# Patient Record
(demographics unavailable — no encounter records)

---

## 2024-10-11 NOTE — DISCUSSION/SUMMARY
[de-identified] : Surgical findings reviewed. Start PT Program, HEP discussed. Return in 4 weeks for ree-valuation.   10/11/2024    RE:  BRUCE ESCALERA   [facility] #- [id number]    Attention:  Nurse Reviewer /Medical Director  I am writing this letter as a medical necessity for PT program. Patient has tried analgesics, non-steroid anti-inflammatory agents,  hot or cold compresses,injections of corticosteroids, etc)  which in combination or by themselves has not worked. Based on my patient's condition, I strongly believe that the PT is medically needed.   Thank you for your time and consideration.

## 2024-10-11 NOTE — DISCUSSION/SUMMARY
[de-identified] : Surgical findings reviewed. Start PT Program, HEP discussed. Return in 4 weeks for ree-valuation.   10/11/2024    RE:  BRUCE ESCALERA   [facility] #- [id number]    Attention:  Nurse Reviewer /Medical Director  I am writing this letter as a medical necessity for PT program. Patient has tried analgesics, non-steroid anti-inflammatory agents,  hot or cold compresses,injections of corticosteroids, etc)  which in combination or by themselves has not worked. Based on my patient's condition, I strongly believe that the PT is medically needed.   Thank you for your time and consideration.

## 2024-10-11 NOTE — PHYSICAL EXAM
[Left] : left shoulder [Sitting] : sitting [Mild] : mild [] : no ecchymosis [TWNoteComboBox7] : active forward flexion 120 degrees

## 2024-10-11 NOTE — HISTORY OF PRESENT ILLNESS
[5] : 5 [Burning] : burning [Localized] : localized [de-identified] : Patient s/p left shoulder arthroscopic labral debridement, cuff debridement, SAD, DCE, calcium excision on 10/1/24. She denies fever/chills. Started some HEP. PAin is manageable.  [] : no [FreeTextEntry1] : l shoulder [FreeTextEntry6] : soreness [de-identified] : 10-1-24 [de-identified] : 10-1-24

## 2024-10-11 NOTE — HISTORY OF PRESENT ILLNESS
[5] : 5 [Burning] : burning [Localized] : localized [de-identified] : Patient s/p left shoulder arthroscopic labral debridement, cuff debridement, SAD, DCE, calcium excision on 10/1/24. She denies fever/chills. Started some HEP. PAin is manageable.  [] : no [FreeTextEntry1] : l shoulder [FreeTextEntry6] : soreness [de-identified] : 10-1-24 [de-identified] : 10-1-24

## 2024-11-01 NOTE — DATA REVIEWED
[FreeTextEntry1] : On my interpretation of these images and reports Lumbar Spine MRI 10/14/2024  fusion intact L4/5. above fusion left sided stenosis. mild foraminal stenosis L5S1 below fusion.  Impression:  Postoperative changes at L4-L5 with interval resolution of central stenosis. There is persistent mild to moderate bilateral foraminal stenosis at this level. Unchanged disc herniations at L1-L2 and L2-L3. See details below. L1-L2: There is a central disc herniation impressing upon the thecal sac resulting in mild central stenosis and mild lateral foraminal stenosis. L2-L3: There is a right subarticular/foraminal disc herniation resulting in moderate lateral recess stenosis and mild right foraminal stenosis. L3-L4: Trace retrolisthesis of L3 on L4. There is a disc bulge resulting in mild central and mild bilateral foraminal stenosis. L4-L5: Bilateral laminectomies noted and susceptibility artifact from posterior instrumentation. There is a disc bulge with interval resolution of central stenosis since the prior preoperative study. There is mild to moderate bilateral foraminal stenosis. There is suggestion of mild bone marrow edema along the left aspect of the L5 vertebral body. Further evaluation with noncontrast CT is recommended to assess the hardware.  I stop paperwork reviewed

## 2024-11-01 NOTE — DISCUSSION/SUMMARY
[de-identified] : Reviewed the updated MRI, which reveals an intact fusion at L4/5. Above the fusion, there is left-sided stenosis. There is mild foraminal stenosis at L5/S1 below the fusion. We discussed that the right-sided radicular leg pain likely stems from the L5S1 region. The patient was advised to continue home exercises and stretching, she declines formal PT.  A referral was made for a consultation with Dr. Abernathy to discuss LESI. Discussed potential surgical candidacy for extension of lumbar decompression and fusion to L5S1 but only if absolutely refractory to exhauustive non operative mgmt and symptoms are functionally incapacitating. F/u 6 weeks  Prior to appointment and during encounter with patient extensive medical records were reviewed including but not limited to, hospital records, outpatient records, imaging results, and lab data. During this appointment the patient was examined, diagnoses were discussed and explained in a face to face manner. In addition extensive time was spent reviewing aforementioned diagnostic studies. Counseling including abnormal image results, differential diagnoses, treatment options, risk and benefits, lifestyle changes, current condition, and current medications was performed. Patient's comments, questions, and concerns were addressed and patient verbalized understanding. Based on this patient's presentation at our office, which is an orthopedic spine surgeon's office, this patient inherently / intrinsically has a risk, however minute, of developing issues such as Cauda equina syndrome, bowel and bladder changes, or progression of motor or neurological deficits such as paralysis which may be permanent.  CLARE RICHARDS Acting as a Scribe for Dr. Marlon TENORIO, Clare Richards, attest that this documentation has been prepared under the direction and in the presence of Provider Aric Mason MD.

## 2024-11-01 NOTE — HISTORY OF PRESENT ILLNESS
[Lower back] : lower back [8] : 8 [7] : 7 [Radiating] : radiating [Constant] : constant [Rest] : rest [Ice] : ice [Sitting] : sitting [Walking] : walking [] : yes [Retired] : Work status: retired [de-identified] : 10/30/2024 - Patient presenting to review lumbar MRI. Twisted ankle Sunday, thus increasing her right buttock pain radiating into her leg.  09/25/2024 -   06/22/2024 - Patient presents with c/o new onset lower lumbar pain with radiation to right buttock, posterior thigh and calf of about 6 weeks duration. describes a fall on stairs landing hard on buttock. Pain trend worsening over time. Taking Tylenol and Ibuprofen. Pain aggravated with extended sitting.  03/18/2024 - The patient is s/p REVISION POSTERIOR FUSION AND INSTRUMENTATION WITH ILIAC GRAFTLUMBAR L4-5 6/12/23. Overall her symptoms are described as tolerable. She complains of low back pain radiating into her buttock/leg aggravated by walking extended distances.   02/05/2024 - The patient is presenting approximately three months post-operation. Overall, her recovery is progressing well, and she is actively participating in physical therapy twice a week, which has proven beneficial in her continued improvement. Notably, she denies experiencing any radiating leg pain during this recovery period. Continues to use bone fusion stim.   12/22/2023 -  The patient is s/p  REVISION POSTERIOR FUSION AND INSTRUMENTATION WITH ILIAC GRAFTLUMBAR L4-5. Overall she is recovering nicely. She complains of intermittent pain into the left leg, which she describes as controlled (11/09/2023).  Taking pain killer as prescribed for symptom control.   11/22/2023 -  The patient is s/p  REVISION POSTERIOR FUSION AND INSTRUMENTATION WITH ILIAC GRAFTLUMBAR L4-5. Overall recovering well postoperatively and she is pleased with her progress thus far. Ambulating well. No incisional or constitutional complaints. Taking pain killer as prescribed for symptom control.  Date of Surgery: 11/09/2023 Pain:    At Rest: _/10  With Activity:  _/10   10/04/2023 - Patient presenting for a CT Review. Symptoms remain the same. She complains of low back pain radiating into primarily the posterior right leg, sometimes will radiate laterally. States pain is getting worse. Unable to get comfortable. Difficulty sleeping. Patient states she has been a non smoker for 40 years. Pain is breaking through physical therapy. Leg pain and claudication improved since surgery in mid June, back pain is worsening, negatively impacting functional capacity.  09/20/2023 - Patient presenting for an MRI Review. She complains of low back pain radiating into primarily the posterior right leg, sometimes will radiate laterally. States pain is getting worse. Unable to get comfortable. Difficulty sleeping. States this pain is different compared to pre op sxs. Taking percoet for symptom control. Currently enrolled in physical therapy which is significantly helpful. Limited with NSAID due to GI issues.   08/30/2023 - Patient returns to the office for follow-up. She is approximately three months status post lumbar instrument effusion. She reports over the last several weeks she's had consistent increase in low back pain radiating down the back of her right leg. She did not recall any recent trauma or injury. She has had to increase her use of pain medication's. She reports symptoms both with prolong, sitting and prolonged standing . denies symptoms in the left lower leg. Denies subject of weakness   07/05/2023 -  63 y/o F presenting 2nd post op LLF on 6/12/23. Some low back pain and right buttock pain. Pain controlled with oxycodone 10. Patient wishes to transition to oxycodone 5.  06/23/2023 - 63 y/o F presenting 1st post op LLF on 6/12/23. Experiencing post operative pains. Taking oxycodone 5 mg and pain is breaking through. Ambulating as best tolerated. Pain is worse with weather changes.  [FreeTextEntry5] : 1.5mos ago fell down the stairs going into the basement, 6 steps, no UC or ER, bruising [FreeTextEntry7] : Rt Buttock [de-identified] : 11/22/2023 [de-identified] : MRI ZP

## 2024-11-01 NOTE — DISCUSSION/SUMMARY
[de-identified] : Reviewed the updated MRI, which reveals an intact fusion at L4/5. Above the fusion, there is left-sided stenosis. There is mild foraminal stenosis at L5/S1 below the fusion. We discussed that the right-sided radicular leg pain likely stems from the L5S1 region. The patient was advised to continue home exercises and stretching, she declines formal PT.  A referral was made for a consultation with Dr. Abernathy to discuss LESI. Discussed potential surgical candidacy for extension of lumbar decompression and fusion to L5S1 but only if absolutely refractory to exhauustive non operative mgmt and symptoms are functionally incapacitating. F/u 6 weeks  Prior to appointment and during encounter with patient extensive medical records were reviewed including but not limited to, hospital records, outpatient records, imaging results, and lab data. During this appointment the patient was examined, diagnoses were discussed and explained in a face to face manner. In addition extensive time was spent reviewing aforementioned diagnostic studies. Counseling including abnormal image results, differential diagnoses, treatment options, risk and benefits, lifestyle changes, current condition, and current medications was performed. Patient's comments, questions, and concerns were addressed and patient verbalized understanding. Based on this patient's presentation at our office, which is an orthopedic spine surgeon's office, this patient inherently / intrinsically has a risk, however minute, of developing issues such as Cauda equina syndrome, bowel and bladder changes, or progression of motor or neurological deficits such as paralysis which may be permanent.  CLARE RICHARDS Acting as a Scribe for Dr. Marlon TENORIO, Clare Richards, attest that this documentation has been prepared under the direction and in the presence of Provider Aric Mason MD.

## 2024-11-01 NOTE — HISTORY OF PRESENT ILLNESS
[Lower back] : lower back [8] : 8 [7] : 7 [Radiating] : radiating [Constant] : constant [Rest] : rest [Ice] : ice [Sitting] : sitting [Walking] : walking [] : yes [Retired] : Work status: retired [de-identified] : 10/30/2024 - Patient presenting to review lumbar MRI. Twisted ankle Sunday, thus increasing her right buttock pain radiating into her leg.  09/25/2024 -   06/22/2024 - Patient presents with c/o new onset lower lumbar pain with radiation to right buttock, posterior thigh and calf of about 6 weeks duration. describes a fall on stairs landing hard on buttock. Pain trend worsening over time. Taking Tylenol and Ibuprofen. Pain aggravated with extended sitting.  03/18/2024 - The patient is s/p REVISION POSTERIOR FUSION AND INSTRUMENTATION WITH ILIAC GRAFTLUMBAR L4-5 6/12/23. Overall her symptoms are described as tolerable. She complains of low back pain radiating into her buttock/leg aggravated by walking extended distances.   02/05/2024 - The patient is presenting approximately three months post-operation. Overall, her recovery is progressing well, and she is actively participating in physical therapy twice a week, which has proven beneficial in her continued improvement. Notably, she denies experiencing any radiating leg pain during this recovery period. Continues to use bone fusion stim.   12/22/2023 -  The patient is s/p  REVISION POSTERIOR FUSION AND INSTRUMENTATION WITH ILIAC GRAFTLUMBAR L4-5. Overall she is recovering nicely. She complains of intermittent pain into the left leg, which she describes as controlled (11/09/2023).  Taking pain killer as prescribed for symptom control.   11/22/2023 -  The patient is s/p  REVISION POSTERIOR FUSION AND INSTRUMENTATION WITH ILIAC GRAFTLUMBAR L4-5. Overall recovering well postoperatively and she is pleased with her progress thus far. Ambulating well. No incisional or constitutional complaints. Taking pain killer as prescribed for symptom control.  Date of Surgery: 11/09/2023 Pain:    At Rest: _/10  With Activity:  _/10   10/04/2023 - Patient presenting for a CT Review. Symptoms remain the same. She complains of low back pain radiating into primarily the posterior right leg, sometimes will radiate laterally. States pain is getting worse. Unable to get comfortable. Difficulty sleeping. Patient states she has been a non smoker for 40 years. Pain is breaking through physical therapy. Leg pain and claudication improved since surgery in mid June, back pain is worsening, negatively impacting functional capacity.  09/20/2023 - Patient presenting for an MRI Review. She complains of low back pain radiating into primarily the posterior right leg, sometimes will radiate laterally. States pain is getting worse. Unable to get comfortable. Difficulty sleeping. States this pain is different compared to pre op sxs. Taking percoet for symptom control. Currently enrolled in physical therapy which is significantly helpful. Limited with NSAID due to GI issues.   08/30/2023 - Patient returns to the office for follow-up. She is approximately three months status post lumbar instrument effusion. She reports over the last several weeks she's had consistent increase in low back pain radiating down the back of her right leg. She did not recall any recent trauma or injury. She has had to increase her use of pain medication's. She reports symptoms both with prolong, sitting and prolonged standing . denies symptoms in the left lower leg. Denies subject of weakness   07/05/2023 -  63 y/o F presenting 2nd post op LLF on 6/12/23. Some low back pain and right buttock pain. Pain controlled with oxycodone 10. Patient wishes to transition to oxycodone 5.  06/23/2023 - 63 y/o F presenting 1st post op LLF on 6/12/23. Experiencing post operative pains. Taking oxycodone 5 mg and pain is breaking through. Ambulating as best tolerated. Pain is worse with weather changes.  [FreeTextEntry5] : 1.5mos ago fell down the stairs going into the basement, 6 steps, no UC or ER, bruising [FreeTextEntry7] : Rt Buttock [de-identified] : 11/22/2023 [de-identified] : MRI ZP left ear hard of hearing, h/o Meniere disease controlled with water pill & Serc tablets

## 2024-12-13 NOTE — DISCUSSION/SUMMARY
[de-identified] : modify activities try OTC meds ice as needed try topical lidocaine for pain control reviewed current medications used by this patient home exercises for functional return will try csi  RE:  BRUCE ESCALERA   [id number]   Attention:  Nurse Reviewer /Medical Director    Based on my patient's condition, I strongly believe that the MRI arth L shoulderis medically.necessary.   The patient has failed oral meds, injections and PT and conservative treatment in combination or by themselves and therefore needs the MRI.   The MRI will dictate further treatment t recommendations.

## 2024-12-13 NOTE — HISTORY OF PRESENT ILLNESS
[Sudden] : sudden [7] : 7 [Radiating] : radiating [Tingling] : tingling [Meds] : meds [Disabled] : Work status: disabled [de-identified] : was doing ok and fell in [place] and landed on Left side and had AS with debridement on 10/1 and was given meds and MDP [] : no [FreeTextEntry1] : l shoulder [FreeTextEntry5] : while in [place], pt tripped on cement and fell on left side on sidewalk. went to a local hospital  [FreeTextEntry7] : down the left arm and back  [FreeTextEntry9] : Tens unit [de-identified] : moving it  [de-identified] : Dr. Lynn [de-identified] : OCT 2024

## 2024-12-13 NOTE — PHYSICAL EXAM
[5___] : external rotation 5[unfilled]/5 [] : positive impingement testing [Left] : left shoulder [There are no fractures, subluxations or dislocations. No significant abnormalities are seen] : There are no fractures, subluxations or dislocations. No significant abnormalities are seen [Degenerative change] : Degenerative change [TWNoteComboBox7] : active forward flexion 155 degrees

## 2024-12-17 NOTE — DISCUSSION/SUMMARY
[de-identified] : We discussed that the right-sided radicular leg pain likely stems from the L5S1 region. The patient was advised to continue home exercises and stretching. Discussed potential surgical candidacy for extension of lumbar decompression and fusion to L5S1 but only if absolutely refractory to exhaustive non operative mgmt and symptoms are functionally incapacitating. Physical therapy referral provided for cervical and lumbar spine. Referral for consult for pain management LESI provided.   F/u 6 weeks  Prior to appointment and during encounter with patient extensive medical records were reviewed including but not limited to, hospital records, outpatient records, imaging results, and lab data. During this appointment the patient was examined, diagnoses were discussed and explained in a face to face manner. In addition extensive time was spent reviewing aforementioned diagnostic studies. Counseling including abnormal image results, differential diagnoses, treatment options, risk and benefits, lifestyle changes, current condition, and current medications was performed. Patient's comments, questions, and concerns were addressed and patient verbalized understanding. Based on this patient's presentation at our office, which is an orthopedic spine surgeon's office, this patient inherently / intrinsically has a risk, however minute, of developing issues such as Cauda equina syndrome, bowel and bladder changes, or progression of motor or neurological deficits such as paralysis which may be permanent.  CHRIS AMEZCUA acting as a Scribe for Dr. Marlon TENORIO, Chris Amezcua, attest that this documentation has been prepared under the direction and in the presence of Provider Aric Mason MD.

## 2024-12-17 NOTE — HISTORY OF PRESENT ILLNESS
[6] : 6 [Disabled] : Work status: disabled [de-identified] : 12/16/2024: Patient presents today for follow up for lumbar spine. She reports that ~3.5 weeks ago while in [place] she suffered a fall due to drop foot, landed on left side. She was seen at local ER where she underwent x-rays, no fractures.  10/30/2024 - Patient presenting to review lumbar MRI. Twisted ankle Sunday, thus increasing her right buttock pain radiating into her leg.  09/25/2024 -   06/22/2024 - Patient presents with c/o new onset lower lumbar pain with radiation to right buttock, posterior thigh and calf of about 6 weeks duration. describes a fall on stairs landing hard on buttock. Pain trend worsening over time. Taking Tylenol and Ibuprofen. Pain aggravated with extended sitting.  03/18/2024 - The patient is s/p REVISION POSTERIOR FUSION AND INSTRUMENTATION WITH ILIAC GRAFTLUMBAR L4-5 6/12/23. Overall her symptoms are described as tolerable. She complains of low back pain radiating into her buttock/leg aggravated by walking extended distances.   02/05/2024 - The patient is presenting approximately three months post-operation. Overall, her recovery is progressing well, and she is actively participating in physical therapy twice a week, which has proven beneficial in her continued improvement. Notably, she denies experiencing any radiating leg pain during this recovery period. Continues to use bone fusion stim.   12/22/2023 -  The patient is s/p  REVISION POSTERIOR FUSION AND INSTRUMENTATION WITH ILIAC GRAFTLUMBAR L4-5. Overall she is recovering nicely. She complains of intermittent pain into the left leg, which she describes as controlled (11/09/2023).  Taking pain killer as prescribed for symptom control.   11/22/2023 -  The patient is s/p  REVISION POSTERIOR FUSION AND INSTRUMENTATION WITH ILIAC GRAFTLUMBAR L4-5. Overall recovering well postoperatively and she is pleased with her progress thus far. Ambulating well. No incisional or constitutional complaints. Taking pain killer as prescribed for symptom control.  Date of Surgery: 11/09/2023 Pain:    At Rest: _/10  With Activity:  _/10   10/04/2023 - Patient presenting for a CT Review. Symptoms remain the same. She complains of low back pain radiating into primarily the posterior right leg, sometimes will radiate laterally. States pain is getting worse. Unable to get comfortable. Difficulty sleeping. Patient states she has been a non smoker for 40 years. Pain is breaking through physical therapy. Leg pain and claudication improved since surgery in mid June, back pain is worsening, negatively impacting functional capacity.  09/20/2023 - Patient presenting for an MRI Review. She complains of low back pain radiating into primarily the posterior right leg, sometimes will radiate laterally. States pain is getting worse. Unable to get comfortable. Difficulty sleeping. States this pain is different compared to pre op sxs. Taking percoet for symptom control. Currently enrolled in physical therapy which is significantly helpful. Limited with NSAID due to GI issues.   08/30/2023 - Patient returns to the office for follow-up. She is approximately three months status post lumbar instrument effusion. She reports over the last several weeks she's had consistent increase in low back pain radiating down the back of her right leg. She did not recall any recent trauma or injury. She has had to increase her use of pain medication's. She reports symptoms both with prolong, sitting and prolonged standing . denies symptoms in the left lower leg. Denies subject of weakness   07/05/2023 -  61 y/o F presenting 2nd post op LLF on 6/12/23. Some low back pain and right buttock pain. Pain controlled with oxycodone 10. Patient wishes to transition to oxycodone 5.  06/23/2023 - 61 y/o F presenting 1st post op LLF on 6/12/23. Experiencing post operative pains. Taking oxycodone 5 mg and pain is breaking through. Ambulating as best tolerated. Pain is worse with weather changes.  [de-identified] : no

## 2024-12-17 NOTE — DATA REVIEWED
[FreeTextEntry1] : On my interpretation of these images and reports Lumbar Spine MRI 10/14/2024  fusion intact L4/5. above fusion left sided stenosis. mild foraminal stenosis L5S1 below fusion.  Impression:  Postoperative changes at L4-L5 with interval resolution of central stenosis. There is persistent mild to moderate bilateral foraminal stenosis at this level. Unchanged disc herniations at L1-L2 and L2-L3. See details below. L1-L2: There is a central disc herniation impressing upon the thecal sac resulting in mild central stenosis and mild lateral foraminal stenosis. L2-L3: There is a right subarticular/foraminal disc herniation resulting in moderate lateral recess stenosis and mild right foraminal stenosis. L3-L4: Trace retrolisthesis of L3 on L4. There is a disc bulge resulting in mild central and mild bilateral foraminal stenosis. L4-L5: Bilateral laminectomies noted and susceptibility artifact from posterior instrumentation. There is a disc bulge with interval resolution of central stenosis since the prior preoperative study. There is mild to moderate bilateral foraminal stenosis. There is suggestion of mild bone marrow edema along the left aspect of the L5 vertebral body. Further evaluation with noncontrast CT is recommended to assess the hardware.  I stop paperwork reviewed Ortho progress notes reviewed

## 2024-12-17 NOTE — HISTORY OF PRESENT ILLNESS
[6] : 6 [Disabled] : Work status: disabled [de-identified] : 12/16/2024: Patient presents today for follow up for lumbar spine. She reports that ~3.5 weeks ago while in [place] she suffered a fall due to drop foot, landed on left side. She was seen at local ER where she underwent x-rays, no fractures.  10/30/2024 - Patient presenting to review lumbar MRI. Twisted ankle Sunday, thus increasing her right buttock pain radiating into her leg.  09/25/2024 -   06/22/2024 - Patient presents with c/o new onset lower lumbar pain with radiation to right buttock, posterior thigh and calf of about 6 weeks duration. describes a fall on stairs landing hard on buttock. Pain trend worsening over time. Taking Tylenol and Ibuprofen. Pain aggravated with extended sitting.  03/18/2024 - The patient is s/p REVISION POSTERIOR FUSION AND INSTRUMENTATION WITH ILIAC GRAFTLUMBAR L4-5 6/12/23. Overall her symptoms are described as tolerable. She complains of low back pain radiating into her buttock/leg aggravated by walking extended distances.   02/05/2024 - The patient is presenting approximately three months post-operation. Overall, her recovery is progressing well, and she is actively participating in physical therapy twice a week, which has proven beneficial in her continued improvement. Notably, she denies experiencing any radiating leg pain during this recovery period. Continues to use bone fusion stim.   12/22/2023 -  The patient is s/p  REVISION POSTERIOR FUSION AND INSTRUMENTATION WITH ILIAC GRAFTLUMBAR L4-5. Overall she is recovering nicely. She complains of intermittent pain into the left leg, which she describes as controlled (11/09/2023).  Taking pain killer as prescribed for symptom control.   11/22/2023 -  The patient is s/p  REVISION POSTERIOR FUSION AND INSTRUMENTATION WITH ILIAC GRAFTLUMBAR L4-5. Overall recovering well postoperatively and she is pleased with her progress thus far. Ambulating well. No incisional or constitutional complaints. Taking pain killer as prescribed for symptom control.  Date of Surgery: 11/09/2023 Pain:    At Rest: _/10  With Activity:  _/10   10/04/2023 - Patient presenting for a CT Review. Symptoms remain the same. She complains of low back pain radiating into primarily the posterior right leg, sometimes will radiate laterally. States pain is getting worse. Unable to get comfortable. Difficulty sleeping. Patient states she has been a non smoker for 40 years. Pain is breaking through physical therapy. Leg pain and claudication improved since surgery in mid June, back pain is worsening, negatively impacting functional capacity.  09/20/2023 - Patient presenting for an MRI Review. She complains of low back pain radiating into primarily the posterior right leg, sometimes will radiate laterally. States pain is getting worse. Unable to get comfortable. Difficulty sleeping. States this pain is different compared to pre op sxs. Taking percoet for symptom control. Currently enrolled in physical therapy which is significantly helpful. Limited with NSAID due to GI issues.   08/30/2023 - Patient returns to the office for follow-up. She is approximately three months status post lumbar instrument effusion. She reports over the last several weeks she's had consistent increase in low back pain radiating down the back of her right leg. She did not recall any recent trauma or injury. She has had to increase her use of pain medication's. She reports symptoms both with prolong, sitting and prolonged standing . denies symptoms in the left lower leg. Denies subject of weakness   07/05/2023 -  61 y/o F presenting 2nd post op LLF on 6/12/23. Some low back pain and right buttock pain. Pain controlled with oxycodone 10. Patient wishes to transition to oxycodone 5.  06/23/2023 - 61 y/o F presenting 1st post op LLF on 6/12/23. Experiencing post operative pains. Taking oxycodone 5 mg and pain is breaking through. Ambulating as best tolerated. Pain is worse with weather changes.  [de-identified] : no

## 2024-12-17 NOTE — DISCUSSION/SUMMARY
[de-identified] : We discussed that the right-sided radicular leg pain likely stems from the L5S1 region. The patient was advised to continue home exercises and stretching. Discussed potential surgical candidacy for extension of lumbar decompression and fusion to L5S1 but only if absolutely refractory to exhaustive non operative mgmt and symptoms are functionally incapacitating. Physical therapy referral provided for cervical and lumbar spine. Referral for consult for pain management LESI provided.   F/u 6 weeks  Prior to appointment and during encounter with patient extensive medical records were reviewed including but not limited to, hospital records, outpatient records, imaging results, and lab data. During this appointment the patient was examined, diagnoses were discussed and explained in a face to face manner. In addition extensive time was spent reviewing aforementioned diagnostic studies. Counseling including abnormal image results, differential diagnoses, treatment options, risk and benefits, lifestyle changes, current condition, and current medications was performed. Patient's comments, questions, and concerns were addressed and patient verbalized understanding. Based on this patient's presentation at our office, which is an orthopedic spine surgeon's office, this patient inherently / intrinsically has a risk, however minute, of developing issues such as Cauda equina syndrome, bowel and bladder changes, or progression of motor or neurological deficits such as paralysis which may be permanent.  CHRIS AMEZCUA acting as a Scribe for Dr. Marlon TENORIO, Chris Amezcua, attest that this documentation has been prepared under the direction and in the presence of Provider Aric Mason MD.

## 2024-12-23 NOTE — PHYSICAL EXAM
[de-identified] : Constitutional: - No acute distress - Well developed; well nourished   Neurological: - normal mood and affect - alert and oriented x 3   Cardiovascular: - grossly normal  Lumbar Spine Exam:   Inspection: erythema (-) ecchymosis (-) rashes (-) alignment: no scoliosis -well-healed surgical scar midline   Palpation: Midline lumbar tenderness:            (-) midline thoracic tenderness:          (-) Lumbar paraspinal tenderness:      L (+); R (+) thoracic paraspinal tenderness:     L (-); R (-) sciatic nerve tenderness :              L (-); R (+) SI joint tenderness:                        L (-); R (-) GTB tenderness:                            L (-); R (-)   ROM:  reduced all planes pain throughout ROM testing  Strength:                                    Right       Left  Hip Flexion:                (5/5)       (5/5) Quadriceps:               (5/5)       (5/5) Hamstrings:                (5/5)       (5/5) Ankle Dorsiflexion:     (5/5)       (5/5) EHL:                           (4+/5)       (5/5) Ankle Plantarflexion:  (5/5)       (5/5)   Special Tests: SLR:                           R (+) ; L (+) Facet loading:            R (+) ; L (+) CEDRIC test:               R (n/a) ; L (n/a) Hamstring tightness:  R (-);  L (-)   Neurologic: SI LT throughout right lower extremity SI LT throughout left lower extremity   Reflexes normal and symmetric bilateral lower extremities   Gait: mildly- antalgic gait ambulates without assistive device

## 2024-12-23 NOTE — HISTORY OF PRESENT ILLNESS
[Lower back] : lower back [8] : 8 [Radiating] : radiating [Shooting] : shooting [Constant] : constant [Household chores] : household chores [Leisure] : leisure [Sleep] : sleep [Heat] : heat [Sitting] : sitting [Walking] : walking [FreeTextEntry1] : 2024 - The patient presents for initial evaluation regarding her low back pain.  Patient was referred by Dr. Mason. Patient has a history of a L4-L5 laminectomy revision (2023) and a L4-L5 laminectomy PSF (2023) with Dr. Mason. She reports after her last surgery, she was doing well until she fell about a 4-6 weeks ago due to her right drop foot. Since then, she has had lower back pain with radiation into bilateral legs (R>L) and her right hip. Leg pain is greater than back pain. Prior to her fall, she did not have any leg symptoms. Prolonged walking exacerbates her pain. She regularly uses a tens machine, ice, and heat for pain. She has not done any formal conservative care such as PT, acupuncture, or chiropractic therapy. She uses Motrin and Tylenol PRN for pain. She tried Gabapentin, Methocarbamol, and Meloxicam in the past without meaningful benefit. She tried Lyrica but experienced side effects. She also tried Oxycodone and Flexeril with some benefit. She has also had bilateral THAs and TKAs several years ago.   Subjective weakness: No Lower extremity paresthesias: No Bladder/bowel dysfunction: No  Injections (OCOA): N/A   Injections (SBU): Yes Lumbar and cervical ESIs   Pertinent Surgical History:  1) L4-L5 laminectomy revision (2023) -Dr. Mason 2) L4-L5 laminectomy PSF (2023) -Dr. Mason 3) Bilateral ARELIS & revision 4) Bilateral TKA   Imagin) MRI Lumbar Spine w/wo Contrast (10/14/2024) - [facility]  L1-2: Unchanged right parasagittal disc herniation mildly effaces the ventral aspect of the thecal sac underlying disc bulging and facet arthropathy contributes to mild bilateral neural foraminal stenosis L2-3: Unchanged right parasagittal and foraminal disc herniation effacing the ventral aspect of the thecal sac and mildly narrowing the right neural foramen L3-4: Disc bulging and mild facet arthropathy. Mild central canal, moderate right and mild to moderate left neural foraminal stenosis L4-5: Decompressive laminectomy mild to moderate bilateral neural foraminal stenosis due to underlying disc bulging and foraminal bone spur L5-S1: Minimal disc bulging with a limited evaluation of the neural foramina secondary to metallic artifact   Physician Disclaimer: I have personally reviewed and confirmed all HPI data with the patient. [] : Post Surgical Visit: no [FreeTextEntry7] : BRUNO GUERIN [de-identified] : L MRI AT

## 2024-12-23 NOTE — DATA REVIEWED
[MRI] : MRI [Lumbar Spine] : lumbar spine [Report was reviewed and noted in the chart] : The report was reviewed and noted in the chart [I reviewed the films/CD] : I reviewed the films/CD [I independently reviewed and interpreted images and report] : I independently reviewed and interpreted images and report

## 2024-12-23 NOTE — PHYSICAL EXAM
[de-identified] : Constitutional: - No acute distress - Well developed; well nourished   Neurological: - normal mood and affect - alert and oriented x 3   Cardiovascular: - grossly normal  Lumbar Spine Exam:   Inspection: erythema (-) ecchymosis (-) rashes (-) alignment: no scoliosis -well-healed surgical scar midline   Palpation: Midline lumbar tenderness:            (-) midline thoracic tenderness:          (-) Lumbar paraspinal tenderness:      L (+); R (+) thoracic paraspinal tenderness:     L (-); R (-) sciatic nerve tenderness :              L (-); R (+) SI joint tenderness:                        L (-); R (-) GTB tenderness:                            L (-); R (-)   ROM:  reduced all planes pain throughout ROM testing  Strength:                                    Right       Left  Hip Flexion:                (5/5)       (5/5) Quadriceps:               (5/5)       (5/5) Hamstrings:                (5/5)       (5/5) Ankle Dorsiflexion:     (5/5)       (5/5) EHL:                           (4+/5)       (5/5) Ankle Plantarflexion:  (5/5)       (5/5)   Special Tests: SLR:                           R (+) ; L (+) Facet loading:            R (+) ; L (+) CEDRIC test:               R (n/a) ; L (n/a) Hamstring tightness:  R (-);  L (-)   Neurologic: SI LT throughout right lower extremity SI LT throughout left lower extremity   Reflexes normal and symmetric bilateral lower extremities   Gait: mildly- antalgic gait ambulates without assistive device

## 2024-12-23 NOTE — HISTORY OF PRESENT ILLNESS
[Lower back] : lower back [8] : 8 [Radiating] : radiating [Shooting] : shooting [Constant] : constant [Household chores] : household chores [Leisure] : leisure [Sleep] : sleep [Heat] : heat [Sitting] : sitting [Walking] : walking [FreeTextEntry1] : 2024 - The patient presents for initial evaluation regarding her low back pain.  Patient was referred by Dr. Mason. Patient has a history of a L4-L5 laminectomy revision (2023) and a L4-L5 laminectomy PSF (2023) with Dr. Mason. She reports after her last surgery, she was doing well until she fell about a 4-6 weeks ago due to her right drop foot. Since then, she has had lower back pain with radiation into bilateral legs (R>L) and her right hip. Leg pain is greater than back pain. Prior to her fall, she did not have any leg symptoms. Prolonged walking exacerbates her pain. She regularly uses a tens machine, ice, and heat for pain. She has not done any formal conservative care such as PT, acupuncture, or chiropractic therapy. She uses Motrin and Tylenol PRN for pain. She tried Gabapentin, Methocarbamol, and Meloxicam in the past without meaningful benefit. She tried Lyrica but experienced side effects. She also tried Oxycodone and Flexeril with some benefit. She has also had bilateral THAs and TKAs several years ago.   Subjective weakness: No Lower extremity paresthesias: No Bladder/bowel dysfunction: No  Injections (OCOA): N/A   Injections (SBU): Yes Lumbar and cervical ESIs   Pertinent Surgical History:  1) L4-L5 laminectomy revision (2023) -Dr. Mason 2) L4-L5 laminectomy PSF (2023) -Dr. Mason 3) Bilateral ARELIS & revision 4) Bilateral TKA   Imagin) MRI Lumbar Spine w/wo Contrast (10/14/2024) - [facility]  L1-2: Unchanged right parasagittal disc herniation mildly effaces the ventral aspect of the thecal sac underlying disc bulging and facet arthropathy contributes to mild bilateral neural foraminal stenosis L2-3: Unchanged right parasagittal and foraminal disc herniation effacing the ventral aspect of the thecal sac and mildly narrowing the right neural foramen L3-4: Disc bulging and mild facet arthropathy. Mild central canal, moderate right and mild to moderate left neural foraminal stenosis L4-5: Decompressive laminectomy mild to moderate bilateral neural foraminal stenosis due to underlying disc bulging and foraminal bone spur L5-S1: Minimal disc bulging with a limited evaluation of the neural foramina secondary to metallic artifact   Physician Disclaimer: I have personally reviewed and confirmed all HPI data with the patient. [] : Post Surgical Visit: no [FreeTextEntry7] : BRUNO GUERIN [de-identified] : L MRI AT

## 2024-12-23 NOTE — ASSESSMENT
[FreeTextEntry1] : We discussed the nature of the underlying pathology and available pain management treatment options. These included interventional, rehabilitative, pharmacological, and complementary modalities. We will proceed with the following:    Interventional treatment options: - Proceed with bilateral L5-S1 TFESI with fluoroscopic guidance - see additional instructions below    Rehabilitative options: - Initiate trial of physical therapy once adequate relief - Participation in active HEP was discussed and encouraged as tolerated   Medication based treatment options: - Initiate trial of cyclobenzaprine 5-10 mg up to TID as needed - Continue OTC NSAIDs on as-needed basis - Patient previously on chronic opioid therapy - See additional instructions below    Complementary treatment options: - Weight management and lifestyle modifications discussed   Additional treatment recommendations as follows: - patient will follow up with Dr. Mason as directed - Patient may require updated lumbar spine MRI given recent fall; evaluate for progression - Follow up 1-2 weeks post injection for assessment of efficacy and further treatment recommendations  The risks, benefits and alternatives of the proposed procedure were explained in detail with the patient. The risks outlined include but are not limited to infection, bleeding, post- dural puncture headache, nerve injury, a temporary increase in pain, failure to resolve symptoms, need for future interventions, allergic reaction, and possible elevation of blood sugar in diabetics if using corticosteroid.  All questions were answered to patient's apparent satisfaction, and he/she verbalized an understanding.  We have discussed the risks, benefits, and alternatives for NSAID therapy including but not limited to the risk of bleeding, thrombosis, gastric mucosal irritation/ulceration, allergic reaction and kidney dysfunction.  The patient was counseled to utilize NSAIDs concurrently with food and/or a PPI if applicable.  They were counseled to use the lowest effective dose for the shortest duration. The patient verbalizes an understanding.  Kimberlee TENORIO, acting as scribe, attest that this documentation has been prepared under the direction and in the presence of Provider Jacinto Abernathy DO.  The documentation recorded by the scribe, in my presence, accurately reflects the service I personally performed, and the decisions made by me with my edits as appropriate.

## 2024-12-27 NOTE — HISTORY OF PRESENT ILLNESS
[Disabled] : Work status: disabled [Sudden] : sudden [7] : 7 [Radiating] : radiating [Tingling] : tingling [Meds] : meds [de-identified] : was doing ok and fell in [place]  in [place] and landed on Left side and had AS with debridement on 10/1 and had csi and had MRA [FreeTextEntry1] : l shoulder [] : no [FreeTextEntry5] : while in [place], pt tripped on cement and fell on left side on sidewalk. went to a local hospital  [FreeTextEntry7] : down the left arm and back  [FreeTextEntry9] : Tens unit [de-identified] : moving it  [de-identified] : Dr. Lynn [de-identified] : OCT 2024 [de-identified] : p/t, pain mgmt- inj

## 2024-12-27 NOTE — REVIEW OF SYSTEMS
[Joint Pain] : joint pain [Muscle Weakness] : muscle weakness [Negative] : Heme/Lymph [FreeTextEntry9] : l shoulder

## 2024-12-27 NOTE — DISCUSSION/SUMMARY
[de-identified] : modify activities try OTC meds ice as needed try topical lidocaine for pain control reviewed current medications used by this patient home exercises for functional return 12/27/2024    RE:  BRUCESRINIVASA ESCALERA   [facility] #- [id number]    Attention:  Nurse Reviewer /Medical Director  I am writing this letter as a medical necessity for PT program. Patient has tried analgesics, non-steroid anti-inflammatory agents,  hot or cold compresses,injections of corticosteroids, etc)  which in combination or by themselves has not worked. Based on my patient's condition, I strongly believe that the PT is medically needed.   Thank you for your time and consideration.

## 2024-12-27 NOTE — PHYSICAL EXAM
[5___] : external rotation 5[unfilled]/5 [Left] : left shoulder [There are no fractures, subluxations or dislocations. No significant abnormalities are seen] : There are no fractures, subluxations or dislocations. No significant abnormalities are seen [Degenerative change] : Degenerative change [] : no swelling [TWNoteComboBox7] : active forward flexion 155 degrees

## 2025-01-10 NOTE — PROCEDURE
[FreeTextEntry3] : Date of Service: 01/10/2025   Account: [id number]  Patient: BRUCE ESCALERA   YOB: 1960  Age: 64 year   Surgeon:      Jacinto Abernathy DO  Assistant:    None  Pre-Operative Diagnosis:         Lumbosacral Radiculopathy                   Post Operative Diagnosis:       Lumbosacral Radiculopathy                 Procedure:             Bilateral L5-S1 transforaminal epidural steroid injection under fluoroscopic guidance.  Anesthesia: MAC  This procedure was carried out using fluoroscopic guidance.  The risks and benefits of the procedure were discussed extensively with the patient.  The consent of the patient was obtained and the following procedure was performed.  A timeout was performed with all essential staff present and the site and side were verified.  The right L5-S1 neural foramen was then identified on right oblique "adrian dog" anatomical view at the 6 o' clock position using fluoroscopic guidance, and the area was marked. The overlying skin and subcutaneous structures were anesthetized using sterile technique with 1% Lidocaine.   A 22-gauge 5-inch spinal needle was directed toward the inferior (6 o'clock) position of the pedicle, which formed the roof of the identified foramen.  Once in the epidural space, after negative aspiration for heme and CSF, 1cc of Omnipaque contrast was injected to confirm epidural location and assess filling defects and rule out intravascular needle placement.   Lumbar epidurogram showed no intravascular or intrathecal flow pattern.  No blood or CSF was aspirated.  Omnipaque spread medially in epidural space and outlined the exiting nerve root.  After this, 2.5 cc of a mixture of 4cc of preservative free normal saline plus 40 mg of Kenalog was injected in the epidural space.  Then attention was focused to the left L5-S1 neural foramen. This was then identified on left oblique "adrian dog" anatomical view at the 6 o' clock position using fluoroscopic guidance, and the area was marked.  The overlying skin and subcutaneous structures were anesthetized using sterile technique with 1% Lidocaine.  A 22-gauge 5-inch spinal needle was directed toward the inferior (6 o'clock) position of the pedicle, which formed the roof of the identified foramen.  Once in the epidural space, after negative aspiration for heme and CSF, 1cc of Omnipaque contrast was injected to confirm epidural location and assess filling defects and rule out intravascular needle placement.   The following contrast flow observed: no intravascular or intrathecal flow pattern was noted.  No blood or CSF was aspirated. Omnipaque spread medially in epidural space.    After this, the remainder of the injectate listed above was injected in the epidural space.  Vital signs remained normal throughout the procedure.  The patient tolerated the procedure well.  There were no immediate complications from the performed procedure.  The patient was instructed to apply ice over the injection sites for twenty minutes every two hours for the next 24 hours.  Disposition:      1.  The patient was advised to F/U in 1-2 weeks to assess the response to the injection.      2.  The patient was also instructed to contact me immediately if there were any concerns related to the procedure performed.

## 2025-01-24 NOTE — PHYSICAL EXAM
[Left] : left shoulder [5___] : external rotation 5[unfilled]/5 [] : no swelling [TWNoteComboBox7] : active forward flexion 155 degrees [TWNoteComboBox4] : passive forward flexion 180 degrees

## 2025-01-24 NOTE — DISCUSSION/SUMMARY
[de-identified] : meds nor did csi help, interefers with ADLs, certain motions and certain days make hher feel worse Patient allowed to gently start resuming activities. Discussed change to medication prescription and usage. Bracing options discussed with patient for stability and support. Activity modification as needed Discussed poss future surgery, pt deciding, questions answered, no guarantees, poss L TSA try topical lidocaine for pain control reviewed current medications used by this patient Home exercises for functional return 01/24/2025    RE:  BRUCE ESCALERA   [facility] #- [id number]    Attention:  Nurse Reviewer /Medical Director  I am writing this letter as a medical necessity for PT program. Patient has tried analgesics, non-steroid anti-inflammatory agents,  hot or cold compresses,injections of corticosteroids, etc)  which in combination or by themselves has not worked. Based on my patient's condition, I strongly believe that the PT is medically needed.   Thank you for your time and consideration.

## 2025-01-24 NOTE — HISTORY OF PRESENT ILLNESS
[8] : 8 [Localized] : localized [Nothing helps with pain getting better] : Nothing helps with pain getting better [de-identified] : was doing ok and fell in [place]  in [place] and landed on Left side and had AS with debridement on 10/1 and had csi and had MRA which showed degen changes no tears [] : no [FreeTextEntry1] : l shoulder [FreeTextEntry6] : shoulder popped and froze

## 2025-01-27 NOTE — ASSESSMENT
[FreeTextEntry1] : We discussed the nature of the underlying pathology and available pain management treatment options. These included interventional, rehabilitative, pharmacological, and complementary modalities. We will proceed with the following:    Interventional treatment options: - Proceed with bilateral L5-S1 TFESI with fluoroscopic guidance - see additional instructions below    Rehabilitative options: - Initiate trial of physical therapy once adequate relief - Participation in active HEP was discussed and encouraged as tolerated   Medication based treatment options: - Initiate trial of cyclobenzaprine 5-10 mg up to TID as needed - Continue OTC NSAIDs on as-needed basis - Patient previously on chronic opioid therapy - See additional instructions below    Complementary treatment options: - Weight management and lifestyle modifications discussed   Additional treatment recommendations as follows: - patient will follow up with Dr. Mason as directed - Patient may require updated lumbar spine MRI given recent fall; evaluate for progression - Follow up 1-2 weeks post injection for assessment of efficacy and further treatment recommendations  The risks, benefits and alternatives of the proposed procedure were explained in detail with the patient. The risks outlined include but are not limited to infection, bleeding, post- dural puncture headache, nerve injury, a temporary increase in pain, failure to resolve symptoms, need for future interventions, allergic reaction, and possible elevation of blood sugar in diabetics if using corticosteroid.  All questions were answered to patient's apparent satisfaction, and he/she verbalized an understanding.  We have discussed the risks, benefits, and alternatives for NSAID therapy including but not limited to the risk of bleeding, thrombosis, gastric mucosal irritation/ulceration, allergic reaction and kidney dysfunction.  The patient was counseled to utilize NSAIDs concurrently with food and/or a PPI if applicable.  They were counseled to use the lowest effective dose for the shortest duration. The patient verbalizes an understanding.

## 2025-01-27 NOTE — PHYSICAL EXAM
[de-identified] : Constitutional: - No acute distress - Well developed; well nourished   Neurological: - normal mood and affect - alert and oriented x 3   Cardiovascular: - grossly normal  Lumbar Spine Exam:   Inspection: erythema (-) ecchymosis (-) rashes (-) alignment: no scoliosis -well-healed surgical scar midline   Palpation: Midline lumbar tenderness:            (-) midline thoracic tenderness:          (-) Lumbar paraspinal tenderness:      L (+); R (+) thoracic paraspinal tenderness:     L (-); R (-) sciatic nerve tenderness :              L (-); R (+) SI joint tenderness:                        L (-); R (-) GTB tenderness:                            L (-); R (-)   ROM:  reduced all planes pain throughout ROM testing  Strength:                                    Right       Left  Hip Flexion:                (5/5)       (5/5) Quadriceps:               (5/5)       (5/5) Hamstrings:                (5/5)       (5/5) Ankle Dorsiflexion:     (5/5)       (5/5) EHL:                           (4+/5)       (5/5) Ankle Plantarflexion:  (5/5)       (5/5)   Special Tests: SLR:                           R (+) ; L (+) Facet loading:            R (+) ; L (+) CEDRIC test:               R (n/a) ; L (n/a) Hamstring tightness:  R (-);  L (-)   Neurologic: SI LT throughout right lower extremity SI LT throughout left lower extremity   Reflexes normal and symmetric bilateral lower extremities   Gait: mildly- antalgic gait ambulates without assistive device

## 2025-01-27 NOTE — HISTORY OF PRESENT ILLNESS
[FreeTextEntry1] : 2025 - Patient presents for FUV after a bilateral L5-S1 TFESI on 1/10/2025. Patient reports  2024 - The patient presents for initial evaluation regarding her low back pain.  Patient was referred by Dr. Mason. Patient has a history of a L4-L5 laminectomy revision (2023) and a L4-L5 laminectomy PSF (2023) with Dr. Mason. She reports after her last surgery, she was doing well until she fell about a 4-6 weeks ago due to her right drop foot. Since then, she has had lower back pain with radiation into bilateral legs (R>L) and her right hip. Leg pain is greater than back pain. Prior to her fall, she did not have any leg symptoms. Prolonged walking exacerbates her pain. She regularly uses a tens machine, ice, and heat for pain. She has not done any formal conservative care such as PT, acupuncture, or chiropractic therapy. She uses Motrin and Tylenol PRN for pain. She tried Gabapentin, Methocarbamol, and Meloxicam in the past without meaningful benefit. She tried Lyrica but experienced side effects. She also tried Oxycodone and Flexeril with some benefit. She has also had bilateral THAs and TKAs several years ago.  Injections (OCOA):  1) Bilateral L5-S1 TFESI on 1/10/2025   Injections (SBU): Yes Lumbar and cervical ESIs   Pertinent Surgical History:  1) L4-L5 laminectomy revision (2023) -Dr. Mason 2) L4-L5 laminectomy PSF (2023) -Dr. Mason 3) Bilateral ARELIS & revision 4) Bilateral TKA   Imagin) MRI Lumbar Spine w/wo Contrast (10/14/2024) - [facility]  L1-2: Unchanged right parasagittal disc herniation mildly effaces the ventral aspect of the thecal sac underlying disc bulging and facet arthropathy contributes to mild bilateral neural foraminal stenosis L2-3: Unchanged right parasagittal and foraminal disc herniation effacing the ventral aspect of the thecal sac and mildly narrowing the right neural foramen L3-4: Disc bulging and mild facet arthropathy. Mild central canal, moderate right and mild to moderate left neural foraminal stenosis L4-5: Decompressive laminectomy mild to moderate bilateral neural foraminal stenosis due to underlying disc bulging and foraminal bone spur L5-S1: Minimal disc bulging with a limited evaluation of the neural foramina secondary to metallic artifact   Physician Disclaimer: I have personally reviewed and confirmed all HPI data with the patient.

## 2025-01-28 NOTE — PHYSICAL EXAM
[de-identified] : Constitutional: - No acute distress - Well developed; well nourished   Neurological: - normal mood and affect - alert and oriented x 3   Cardiovascular: - grossly normal  Lumbar Spine Exam:   Inspection: erythema (-) ecchymosis (-) rashes (-) alignment: no scoliosis -well-healed surgical scar midline   Palpation: Midline lumbar tenderness:            (-) midline thoracic tenderness:          (-) Lumbar paraspinal tenderness:      L (+); R (+) thoracic paraspinal tenderness:     L (-); R (-) sciatic nerve tenderness :              L (-); R (+) SI joint tenderness:                        L (-); R (-) GTB tenderness:                            L (-); R (-)   ROM:  reduced all planes pain throughout ROM testing  Strength:                                    Right       Left  Hip Flexion:                (5/5)       (5/5) Quadriceps:               (5/5)       (5/5) Hamstrings:                (5/5)       (5/5) Ankle Dorsiflexion:     (5/5)       (5/5) EHL:                           (4+/5)       (5/5) Ankle Plantarflexion:  (5/5)       (5/5)   Special Tests: SLR:                           R (+) ; L (+) Facet loading:            R (+) ; L (+) CEDRIC test:               R (n/a) ; L (n/a) Hamstring tightness:  R (-);  L (-)   Neurologic: SI LT throughout right lower extremity SI LT throughout left lower extremity   Reflexes normal and symmetric bilateral lower extremities   Gait: mildly- antalgic gait ambulates without assistive device